# Patient Record
Sex: FEMALE | Race: BLACK OR AFRICAN AMERICAN | NOT HISPANIC OR LATINO | Employment: STUDENT | URBAN - METROPOLITAN AREA
[De-identification: names, ages, dates, MRNs, and addresses within clinical notes are randomized per-mention and may not be internally consistent; named-entity substitution may affect disease eponyms.]

---

## 2024-03-26 ENCOUNTER — TELEPHONE (OUTPATIENT)
Dept: OBSTETRICS AND GYNECOLOGY | Facility: CLINIC | Age: 19
End: 2024-03-26
Payer: MEDICAID

## 2024-03-26 NOTE — TELEPHONE ENCOUNTER
Pt's mother is aware of her appt with Dr. Daniel on 3/28/24 at 9. Elana                ----- Message from Ella Villarreal sent at 3/25/2024  4:12 PM CDT -----  Regarding: Appointment  Contact: patient  Per phone call with patient, she stated that she would like to schedule an appointment to see the physician regarding an annual.  Please return call at [phone] (home).    Thanks,  SJ

## 2024-03-28 ENCOUNTER — OFFICE VISIT (OUTPATIENT)
Dept: OBSTETRICS AND GYNECOLOGY | Facility: CLINIC | Age: 19
End: 2024-03-28
Payer: MEDICAID

## 2024-03-28 ENCOUNTER — TELEPHONE (OUTPATIENT)
Dept: OBSTETRICS AND GYNECOLOGY | Facility: CLINIC | Age: 19
End: 2024-03-28

## 2024-03-28 VITALS — WEIGHT: 228 LBS | DIASTOLIC BLOOD PRESSURE: 80 MMHG | HEART RATE: 74 BPM | SYSTOLIC BLOOD PRESSURE: 125 MMHG

## 2024-03-28 DIAGNOSIS — Z30.016 ENCOUNTER FOR INITIAL PRESCRIPTION OF TRANSDERMAL PATCH HORMONAL CONTRACEPTIVE DEVICE: ICD-10-CM

## 2024-03-28 DIAGNOSIS — Z11.3 SCREENING EXAMINATION FOR VENEREAL DISEASE: ICD-10-CM

## 2024-03-28 DIAGNOSIS — Z01.419 WELL WOMAN EXAM WITH ROUTINE GYNECOLOGICAL EXAM: Primary | ICD-10-CM

## 2024-03-28 DIAGNOSIS — G40.909 SEIZURE DISORDER: ICD-10-CM

## 2024-03-28 PROCEDURE — 1160F RVW MEDS BY RX/DR IN RCRD: CPT | Mod: CPTII,S$GLB,, | Performed by: STUDENT IN AN ORGANIZED HEALTH CARE EDUCATION/TRAINING PROGRAM

## 2024-03-28 PROCEDURE — 3074F SYST BP LT 130 MM HG: CPT | Mod: CPTII,S$GLB,, | Performed by: STUDENT IN AN ORGANIZED HEALTH CARE EDUCATION/TRAINING PROGRAM

## 2024-03-28 PROCEDURE — 99385 PREV VISIT NEW AGE 18-39: CPT | Mod: S$GLB,,, | Performed by: STUDENT IN AN ORGANIZED HEALTH CARE EDUCATION/TRAINING PROGRAM

## 2024-03-28 PROCEDURE — 3079F DIAST BP 80-89 MM HG: CPT | Mod: CPTII,S$GLB,, | Performed by: STUDENT IN AN ORGANIZED HEALTH CARE EDUCATION/TRAINING PROGRAM

## 2024-03-28 PROCEDURE — 1159F MED LIST DOCD IN RCRD: CPT | Mod: CPTII,S$GLB,, | Performed by: STUDENT IN AN ORGANIZED HEALTH CARE EDUCATION/TRAINING PROGRAM

## 2024-03-28 RX ORDER — CARBAMAZEPINE 200 MG/1
200 TABLET, EXTENDED RELEASE ORAL 2 TIMES DAILY
COMMUNITY
Start: 2024-03-26

## 2024-03-28 RX ORDER — NORELGESTROMIN AND ETHINYL ESTRADIOL 35; 150 UG/MG; UG/MG
1 PATCH TRANSDERMAL WEEKLY
Qty: 4 PATCH | Refills: 11 | Status: SHIPPED | OUTPATIENT
Start: 2024-03-28 | End: 2025-03-28

## 2024-03-28 NOTE — TELEPHONE ENCOUNTER
Left voicemail for patient to call office back. Pt v/u      ----- Message from Gloria Washington sent at 3/28/2024 10:47 AM CDT -----  Contact: Nicholas  Patient is calling to receive a call back at .[phone]. Reports wanting to speak further about birth control mentioned at today's visit. States also wanting to speak about weight loss.

## 2024-03-28 NOTE — TELEPHONE ENCOUNTER
Patient calling because she decide against the Nexplanon and would like to get the birth control patch instead. I updated her pharmacy and informed her that the patches will be called out to her pharmacy. Patient verbalized understanding.       ----- Message from Nguyen Larsen sent at 3/28/2024 11:00 AM CDT -----  Type:  Patient Returning Call    Who Called:pt  Who Left Message for Patient:Lucio  Does the patient know what this is regarding?:return call  Would the patient rather a call back or a response via [facility]? call  Best Call Back Number:[phone]  Additional Information: .    Thank you

## 2024-03-28 NOTE — PROGRESS NOTES
Chief Complaint: Annual Exam    HPI: 19 y.o. G0 here for Annual Exam.  Patient doing well today.  She reports her cycles are monthly lasting for 5 days with bleeding.  She has not currently on medication for cycle regulation contraception.  She was interested in the Nexplanon, however decided on the patch.  She has no other complaints today    Review of Systems   Constitutional:  Negative for chills and fever.   HENT:  Negative for congestion and sore throat.    Respiratory:  Negative for cough and shortness of breath.    Cardiovascular:  Negative for chest pain and palpitations.   Gastrointestinal:  Negative for abdominal pain, nausea and vomiting.   Genitourinary:  Negative for dysuria, frequency and urgency.   Musculoskeletal:  Negative for back pain.   Skin:  Negative for itching and rash.   Neurological:  Negative for headaches.   All other systems reviewed and are negative.    Past Medical History seizures  Past Surgical History tonsillectomy  Past OB History: G0  Past Gyn History: Denies prior abnormal pap smears, denies STD's, menstrual history as above.   Contraception History: none  Social History: denies t/d/e  Family History denies breast, colon, ovarian, or uterine cancer  Allergies: NKDA  Current Outpatient Medications   Medication Instructions    TEGRETOL  mg, Oral, 2 times daily     Physical Exam:  Vitals:    03/28/24 0908   BP: 125/80   Pulse: 74     There is no height or weight on file to calculate BMI.  Physical Exam  Vitals reviewed. Exam conducted with a chaperone present.   Constitutional:       General: She is not in acute distress.     Appearance: Normal appearance.   HENT:      Head: Normocephalic and atraumatic.   Eyes:      Extraocular Movements: Extraocular movements intact.      Pupils: Pupils are equal, round, and reactive to light.   Pulmonary:      Effort: Pulmonary effort is normal. No respiratory distress.   Abdominal:      General: There is no distension.      Palpations:  Abdomen is soft.      Tenderness: There is no abdominal tenderness.   Musculoskeletal:         General: No swelling or tenderness. Normal range of motion.      Cervical back: Normal range of motion and neck supple.   Skin:     General: Skin is warm and dry.   Neurological:      General: No focal deficit present.      Mental Status: She is alert and oriented to person, place, and time.        ASSESSMENT:   Patient is a 19 y.o. G0 who presents for annual exam     Patient Active Problem List   Diagnosis    Seizure disorder     PLAN:  Pap not indicated until 21 yo  Gc/Cz collected   Discussed contraception, pt desires the patch, prescribed today   Pre-conception counseling- folic acid and PNV  Counseling on self-breast exams, diet, and exercise.  RTC in 3 months for follow up

## 2024-04-01 PROBLEM — A59.9 TRICHOMONAS VAGINALIS INFECTION: Status: ACTIVE | Noted: 2024-04-01

## 2024-04-01 PROBLEM — A74.9 CHLAMYDIA INFECTION: Status: ACTIVE | Noted: 2024-04-01

## 2024-04-01 LAB
CHLAMYDIA: POSITIVE
GONORRHEA: NEGATIVE
SOURCE: ABNORMAL
SOURCE: ABNORMAL
TRICHOMONAS AMPLIFIED: POSITIVE

## 2024-04-03 ENCOUNTER — TELEPHONE (OUTPATIENT)
Dept: OBSTETRICS AND GYNECOLOGY | Facility: CLINIC | Age: 19
End: 2024-04-03
Payer: MEDICAID

## 2024-04-03 NOTE — TELEPHONE ENCOUNTER
I have tried over 5+ attempts to get in contact with the patient to discuss lab results and the phone goes straight to . Vm was left for patient to call office back to discuss.

## 2024-04-03 NOTE — TELEPHONE ENCOUNTER
----- Message from Kole Daniel MD sent at 4/1/2024  4:01 PM CDT -----  Please call patient and notify of +Trichomonas. Patient may be treated in clinic with Flagyl 2 grams PO x 1 or a prescription can be called in for Flagyl 500 mg, 4 tablets PO x 1, Disp: #4, No refills, Diagnosis code: A59.00.    Patient's partner needs to be tested and treated as well. No intercourse x 2 weeks after both patient and partner treated.     Please notify patient of +chlamydia. Patient may be treated in clinic with Azithromycin 1 gram PO x 1 or a prescription may be called in for Azithromycin 500 mg, 2 tablets PO x 1, Disp: 2 tablets, No refills, Diagnosis code: A56.00.    Patient's partner needs testing and treatment as well. No intercourse x 2 weeks after patient and partner treated.

## 2024-04-04 RX ORDER — AZITHROMYCIN 500 MG/1
500 TABLET, FILM COATED ORAL DAILY
Qty: 2 TABLET | Refills: 0 | OUTPATIENT
Start: 2024-04-04 | End: 2024-04-06

## 2024-04-04 RX ORDER — METRONIDAZOLE 500 MG/1
500 TABLET ORAL 4 TIMES DAILY
Qty: 4 TABLET | Refills: 0 | OUTPATIENT
Start: 2024-04-04 | End: 2024-04-08